# Patient Record
Sex: FEMALE | HISPANIC OR LATINO | Employment: FULL TIME | URBAN - METROPOLITAN AREA
[De-identification: names, ages, dates, MRNs, and addresses within clinical notes are randomized per-mention and may not be internally consistent; named-entity substitution may affect disease eponyms.]

---
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
## 2019-01-06 DIAGNOSIS — Z01.84 IMMUNITY STATUS TESTING: ICD-10-CM

## 2019-01-07 ENCOUNTER — HOSPITAL ENCOUNTER (OUTPATIENT)
Dept: LAB | Facility: MEDICAL CENTER | Age: 19
End: 2019-01-07
Attending: NURSE PRACTITIONER
Payer: COMMERCIAL

## 2019-01-07 DIAGNOSIS — Z01.84 IMMUNITY STATUS TESTING: ICD-10-CM

## 2019-01-07 PROCEDURE — 86480 TB TEST CELL IMMUN MEASURE: CPT

## 2019-01-07 PROCEDURE — 36415 COLL VENOUS BLD VENIPUNCTURE: CPT

## 2019-01-09 LAB
GAMMA INTERFERON BACKGROUND BLD IA-ACNC: 0.03 IU/ML
M TB IFN-G BLD-IMP: NEGATIVE
M TB IFN-G CD4+ BCKGRND COR BLD-ACNC: 0 IU/ML
MITOGEN IGNF BCKGRD COR BLD-ACNC: >10 IU/ML
QFT TB2 - NIL TBQ2: -0.01 IU/ML

## 2019-01-18 ENCOUNTER — APPOINTMENT (OUTPATIENT)
Dept: DERMATOLOGY | Facility: IMAGING CENTER | Age: 19
End: 2019-01-18

## 2019-02-07 ENCOUNTER — APPOINTMENT (OUTPATIENT)
Dept: DERMATOLOGY | Facility: IMAGING CENTER | Age: 19
End: 2019-02-07

## 2019-03-01 ENCOUNTER — OFFICE VISIT (OUTPATIENT)
Dept: MEDICAL GROUP | Facility: MEDICAL CENTER | Age: 19
End: 2019-03-01
Payer: COMMERCIAL

## 2019-03-01 VITALS
DIASTOLIC BLOOD PRESSURE: 70 MMHG | OXYGEN SATURATION: 96 % | RESPIRATION RATE: 16 BRPM | SYSTOLIC BLOOD PRESSURE: 112 MMHG | HEIGHT: 65 IN | BODY MASS INDEX: 33.82 KG/M2 | TEMPERATURE: 97.9 F | HEART RATE: 101 BPM | WEIGHT: 203 LBS

## 2019-03-01 DIAGNOSIS — F41.1 GAD (GENERALIZED ANXIETY DISORDER): ICD-10-CM

## 2019-03-01 PROCEDURE — 99213 OFFICE O/P EST LOW 20 MIN: CPT | Performed by: NURSE PRACTITIONER

## 2019-03-01 RX ORDER — ESCITALOPRAM OXALATE 10 MG/1
10 TABLET ORAL DAILY
Qty: 30 TAB | Refills: 2 | Status: SHIPPED | OUTPATIENT
Start: 2019-03-01 | End: 2019-04-10 | Stop reason: SDUPTHER

## 2019-03-01 NOTE — PATIENT INSTRUCTIONS
Escitalopram tablets  What is this medicine?  ESCITALOPRAM (es sye MARGARITO oh pram) is used to treat depression and certain types of anxiety.  This medicine may be used for other purposes; ask your health care provider or pharmacist if you have questions.  COMMON BRAND NAME(S): Lexapro  What should I tell my health care provider before I take this medicine?  They need to know if you have any of these conditions:  -bipolar disorder or a family history of bipolar disorder  -diabetes  -glaucoma  -heart disease  -kidney or liver disease  -receiving electroconvulsive therapy  -seizures (convulsions)  -suicidal thoughts, plans, or attempt by you or a family member  -an unusual or allergic reaction to escitalopram, the related drug citalopram, other medicines, foods, dyes, or preservatives  -pregnant or trying to become pregnant  -breast-feeding  How should I use this medicine?  Take this medicine by mouth with a glass of water. Follow the directions on the prescription label. You can take it with or without food. If it upsets your stomach, take it with food. Take your medicine at regular intervals. Do not take it more often than directed. Do not stop taking this medicine suddenly except upon the advice of your doctor. Stopping this medicine too quickly may cause serious side effects or your condition may worsen.  A special MedGuide will be given to you by the pharmacist with each prescription and refill. Be sure to read this information carefully each time.  Talk to your pediatrician regarding the use of this medicine in children. Special care may be needed.  Overdosage: If you think you have taken too much of this medicine contact a poison control center or emergency room at once.  NOTE: This medicine is only for you. Do not share this medicine with others.  What if I miss a dose?  If you miss a dose, take it as soon as you can. If it is almost time for your next dose, take only that dose. Do not take double or extra  doses.  What may interact with this medicine?  Do not take this medicine with any of the following medications:  -certain medicines for fungal infections like fluconazole, itraconazole, ketoconazole, posaconazole, voriconazole  -cisapride  -citalopram  -dofetilide  -dronedarone  -linezolid  -MAOIs like Carbex, Eldepryl, Marplan, Nardil, and Parnate  -methylene blue (injected into a vein)  -pimozide  -thioridazine  -ziprasidone  This medicine may also interact with the following medications:  -alcohol  -amphetamines  -aspirin and aspirin-like medicines  -carbamazepine  -certain medicines for depression, anxiety, or psychotic disturbances  -certain medicines for migraine headache like almotriptan, eletriptan, frovatriptan, naratriptan, rizatriptan, sumatriptan, zolmitriptan  -certain medicines for sleep  -certain medicines that treat or prevent blood clots like warfarin, enoxaparin, dalteparin  -cimetidine  -diuretics  -fentanyl  -furazolidone  -isoniazid  -lithium  -metoprolol  -NSAIDs, medicines for pain and inflammation, like ibuprofen or naproxen  -other medicines that prolong the QT interval (cause an abnormal heart rhythm)  -procarbazine  -rasagiline  -supplements like [place]'s wort, kava kava, valerian  -tramadol  -tryptophan  This list may not describe all possible interactions. Give your health care provider a list of all the medicines, herbs, non-prescription drugs, or dietary supplements you use. Also tell them if you smoke, drink alcohol, or use illegal drugs. Some items may interact with your medicine.  What should I watch for while using this medicine?  Tell your doctor if your symptoms do not get better or if they get worse. Visit your doctor or health care professional for regular checks on your progress. Because it may take several weeks to see the full effects of this medicine, it is important to continue your treatment as prescribed by your doctor.  Patients and their families should watch out for  new or worsening thoughts of suicide or depression. Also watch out for sudden changes in feelings such as feeling anxious, agitated, panicky, irritable, hostile, aggressive, impulsive, severely restless, overly excited and hyperactive, or not being able to sleep. If this happens, especially at the beginning of treatment or after a change in dose, call your health care professional.  You may get drowsy or dizzy. Do not drive, use machinery, or do anything that needs mental alertness until you know how this medicine affects you. Do not stand or sit up quickly, especially if you are an older patient. This reduces the risk of dizzy or fainting spells. Alcohol may interfere with the effect of this medicine. Avoid alcoholic drinks.  Your mouth may get dry. Chewing sugarless gum or sucking hard candy, and drinking plenty of water may help. Contact your doctor if the problem does not go away or is severe.  What side effects may I notice from receiving this medicine?  Side effects that you should report to your doctor or health care professional as soon as possible:  -allergic reactions like skin rash, itching or hives, swelling of the face, lips, or tongue  -anxious  -black, tarry stools  -changes in vision  -confusion  -elevated mood, decreased need for sleep, racing thoughts, impulsive behavior  -eye pain  -fast, irregular heartbeat  -feeling faint or lightheaded, falls  -feeling agitated, angry, or irritable  -hallucination, loss of contact with reality  -loss of balance or coordination  -loss of memory  -painful or prolonged erections  -restlessness, pacing, inability to keep still  -seizures  -stiff muscles  -suicidal thoughts or other mood changes  -trouble sleeping  -unusual bleeding or bruising  -unusually weak or tired  -vomiting  Side effects that usually do not require medical attention (report to your doctor or health care professional if they continue or are bothersome):  -changes in appetite  -change in sex  drive or performance  -headache  -increased sweating  -indigestion, nausea  -tremors  This list may not describe all possible side effects. Call your doctor for medical advice about side effects. You may report side effects to FDA at 9-923-MVU-7599.  Where should I keep my medicine?  Keep out of reach of children.  Store at room temperature between 15 and 30 degrees C (59 and 86 degrees F). Throw away any unused medicine after the expiration date.  NOTE: This sheet is a summary. It may not cover all possible information. If you have questions about this medicine, talk to your doctor, pharmacist, or health care provider.  © 2018 [employer]/Gold Standard (2017-05-22 13:20:23)

## 2019-03-01 NOTE — PROGRESS NOTES
cc:  anxiety      Subjective:     HPI:     Shon Kemp is a 19 y.o. female here to discuss the evaluation and management of:    Anxiety  This is a new problem.  Patient states that she is been feeling a heaviness on her chest and she is been having racing thoughts, daily worries and having trouble falling asleep.  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  She feels that she has daily worries about small problems and has a hard time falling asleep because she is running through the events of the day and also worrying about what will be occurring the next day.  She occasionally feels her heart racing.      ROS:  Denies any Headache, Blurred Vision, Confusion, Chest pain,  Shortness of breath,  Abdominal pain, Changes of bowel or bladder, Lower ext edema, Fevers, Nights sweats, Weight Changes, Focal weakness or numbness.  And all other systems are negative.Anxiety        Current Outpatient Prescriptions:   •  escitalopram (LEXAPRO) 10 MG Tab, Take 1 Tab by mouth every day., Disp: 30 Tab, Rfl: 2    No Known Allergies    Past Medical History:   Diagnosis Date   • IUD (intrauterine device) in place     Paragard 2017     No past surgical history on file.  Family History   Problem Relation Age of Onset   • Thyroid Mother    • Hyperlipidemia Mother    • Asthma Father    • Bipolar disorder Father    • Thyroid Sister    • Diabetes Maternal Grandmother    • Heart Attack Maternal Grandfather         x3   • Hypertension Maternal Grandfather    • Hyperlipidemia Maternal Grandfather    • No Known Problems Paternal Grandmother    • No Known Problems Paternal Grandfather    • Diabetes Sister         type 1     Social History     Social History   • Marital status: Single     Spouse name: N/A   • Number of children: N/A   • Years of education: N/A     Occupational History   • Not on file.     Social History Main Topics  "  • Smoking status: Never Smoker   • Smokeless tobacco: Never Used   • Alcohol use Yes   • Drug use: No   • Sexual activity: Yes     Partners: Male     Birth control/ protection: IUD     Other Topics Concern   • Not on file     Social History Narrative   • No narrative on file       Objective:     Vitals: /70   Pulse (!) 101   Temp 36.6 °C (97.9 °F)   Resp 16   Ht 1.651 m (5' 5\")   Wt 92.1 kg (203 lb)   SpO2 96%   BMI 33.78 kg/m²    General: Alert, pleasant, NAD  HEENT: Normocephalic.    Skin: Warm, dry, no rashes.  Musculoskeletal: Gait is normal.  Moves all extremities well.  Extremities: No leg edema. No discoloration  Neurological: No tremors, sensation grossly intact, gait is normal,   Psych:  Affect/mood is normal, judgement is good, memory is intact, grooming is appropriate.    Assessment/Plan:     Shon was seen today for anxiety.    Diagnoses and all orders for this visit:    PHILLIP (generalized anxiety disorder)  Not well controlled. Feeling anxious with school and working 40 hours.  Have advised patient to try to cut back at her work hours if this is feasible.  Have also encouraged her to work on deep breathing exercises multiple times throughout the day.  Again reduce caffeine intake, work on trying to incorporate some sort of physical activity throughout the day.  Have discussed that we will trial Lexapro 10 mg and she will follow back up in 6 weeks.  Have reviewed side effects and medication implications.    -     escitalopram (LEXAPRO) 10 MG Tab; Take 1 Tab by mouth every day.    Return in about 6 weeks (around 4/12/2019).        Meena PENG  "

## 2019-03-04 ENCOUNTER — APPOINTMENT (OUTPATIENT)
Dept: DERMATOLOGY | Facility: IMAGING CENTER | Age: 19
End: 2019-03-04

## 2019-04-10 ENCOUNTER — OFFICE VISIT (OUTPATIENT)
Dept: MEDICAL GROUP | Facility: MEDICAL CENTER | Age: 19
End: 2019-04-10
Payer: COMMERCIAL

## 2019-04-10 VITALS
WEIGHT: 197 LBS | HEIGHT: 65 IN | RESPIRATION RATE: 16 BRPM | HEART RATE: 71 BPM | DIASTOLIC BLOOD PRESSURE: 70 MMHG | TEMPERATURE: 97.3 F | SYSTOLIC BLOOD PRESSURE: 110 MMHG | OXYGEN SATURATION: 97 % | BODY MASS INDEX: 32.82 KG/M2

## 2019-04-10 DIAGNOSIS — Z83.3 FAMILY HISTORY OF DIABETES MELLITUS TYPE I: ICD-10-CM

## 2019-04-10 DIAGNOSIS — F41.1 GAD (GENERALIZED ANXIETY DISORDER): ICD-10-CM

## 2019-04-10 PROCEDURE — 99213 OFFICE O/P EST LOW 20 MIN: CPT | Performed by: NURSE PRACTITIONER

## 2019-04-10 RX ORDER — ESCITALOPRAM OXALATE 10 MG/1
10 TABLET ORAL DAILY
Qty: 30 TAB | Refills: 6 | Status: SHIPPED | OUTPATIENT
Start: 2019-04-10 | End: 2019-05-10 | Stop reason: SDUPTHER

## 2019-04-10 NOTE — LETTER
"April 10, 2019        Shon Krauseenzuela  [address]  [id number]  [place] [address]      To Whom it May Concern:    Shon Kemp is under my medical care at this clinic. It has come to my attention that she finds her dog \"Moe\" emotionally supportive and benefits from his company therapeutically.           Sincerely,        ROSARIO Little.    Electronically Signed     "

## 2019-04-10 NOTE — PROGRESS NOTES
cc:  Follow up anxiety/concerned about diabetes      Subjective:     HPI:     Shon Kemp is a 19 y.o. female here to discuss the evaluation and management of:    Anxiety  Patient states that she has been tolerating the Lexapro since her last visit.  States this is very helpful for her anxiety and racing thoughts.  Denies any side effects.  She would like to continue taking this medication at this time.  She needs refills.      Family history of type 1 diabetes  Patient does inform me that her younger full sister who is 23 was recently diagnosed with Type 1 diabetes and her half sister who is 35 has Type 1 diabetes as well. She is concerned that she will end up with type 1 diabetes as well.  Denies any polyuria, polydipsia, or weight loss.  She is requesting C-peptide labs.    ROS:  Denies any Headache, Blurred Vision, Confusion, Chest pain,  Shortness of breath,  Abdominal pain, Changes of bowel or bladder, Lower ext edema, Fevers, Nights sweats, Weight Changes, Focal weakness or numbness.  And all other systems are negative.        Current Outpatient Prescriptions:   •  escitalopram (LEXAPRO) 10 MG Tab, Take 1 Tab by mouth every day., Disp: 30 Tab, Rfl: 6    No Known Allergies    Past Medical History:   Diagnosis Date   • IUD (intrauterine device) in place     Paragard 2017     No past surgical history on file.  Family History   Problem Relation Age of Onset   • Thyroid Mother    • Hyperlipidemia Mother    • Asthma Father    • Bipolar disorder Father    • Diabetes Sister         type 1   • Diabetes Maternal Grandmother         type 2   • Heart Attack Maternal Grandfather         x3   • Hypertension Maternal Grandfather    • Hyperlipidemia Maternal Grandfather    • No Known Problems Paternal Grandmother    • No Known Problems Paternal Grandfather    • Diabetes Sister         type 1     Social History     Social History   • Marital status: Single     Spouse name: N/A   • Number of children: N/A   •  "Years of education: N/A     Occupational History   • Not on file.     Social History Main Topics   • Smoking status: Never Smoker   • Smokeless tobacco: Never Used   • Alcohol use Yes   • Drug use: No   • Sexual activity: Yes     Partners: Male     Birth control/ protection: IUD     Other Topics Concern   • Not on file     Social History Narrative   • No narrative on file       Objective:     Vitals: /70   Pulse 71   Temp 36.3 °C (97.3 °F)   Resp 16   Ht 1.651 m (5' 5\")   Wt 89.4 kg (197 lb)   SpO2 97%   BMI 32.78 kg/m²    General: Alert, pleasant, NAD  HEENT: Normocephalic.    Skin: Warm, dry, no rashes.  Musculoskeletal: Gait is normal.  Moves all extremities well.  Extremities: No leg edema. No discoloration  Neurological: No tremors, sensation grossly intact, gait is normal,   Psych:  Affect/mood is normal, judgement is good, memory is intact, grooming is appropriate.    Assessment/Plan:     Diagnoses and all orders for this visit:    PHILLIP (generalized anxiety disorder)  Improving with the Lexapro.  Patient like to continue on this medication is very helpful for her racing thoughts and her anxiety.  Have refilled this for patient.  Follow-up if changes present.  -     escitalopram (LEXAPRO) 10 MG Tab; Take 1 Tab by mouth every day.    Family history of diabetes mellitus type I  -     ANTI PHILLIP ANTIBODIES  -     INSULIN AND C-PEPTIDE, SERUM        Return in about 6 months (around 10/10/2019).          Meena PENG"

## 2019-04-11 ENCOUNTER — HOSPITAL ENCOUNTER (OUTPATIENT)
Dept: LAB | Facility: MEDICAL CENTER | Age: 19
End: 2019-04-11
Attending: NURSE PRACTITIONER
Payer: COMMERCIAL

## 2019-04-11 PROCEDURE — 84681 ASSAY OF C-PEPTIDE: CPT

## 2019-04-11 PROCEDURE — 86337 INSULIN ANTIBODIES: CPT

## 2019-04-11 PROCEDURE — 86341 ISLET CELL ANTIBODY: CPT

## 2019-04-11 PROCEDURE — 36415 COLL VENOUS BLD VENIPUNCTURE: CPT

## 2019-04-13 LAB — C PEPTIDE SERPL-MCNC: 1.8 NG/ML (ref 0.8–3.5)

## 2019-04-14 LAB — GAD65 AB SER IA-ACNC: <5 IU/ML (ref 0–5)

## 2019-04-16 LAB — INSULIN HUMAN AB SER-ACNC: <0.4 U/ML (ref 0–0.4)

## 2019-04-17 ENCOUNTER — OFFICE VISIT (OUTPATIENT)
Dept: DERMATOLOGY | Facility: IMAGING CENTER | Age: 19
End: 2019-04-17
Payer: COMMERCIAL

## 2019-04-17 VITALS — WEIGHT: 197 LBS | HEIGHT: 65 IN | BODY MASS INDEX: 32.82 KG/M2 | TEMPERATURE: 97.7 F

## 2019-04-17 DIAGNOSIS — L85.8 KERATOSIS PILARIS: ICD-10-CM

## 2019-04-17 DIAGNOSIS — L70.0 ACNE VULGARIS: ICD-10-CM

## 2019-04-17 DIAGNOSIS — L29.9 PRURITUS: ICD-10-CM

## 2019-04-17 DIAGNOSIS — B36.0 TINEA VERSICOLOR: ICD-10-CM

## 2019-04-17 DIAGNOSIS — D22.4 IRRITATED NEVUS OF NECK: ICD-10-CM

## 2019-04-17 PROCEDURE — 99203 OFFICE O/P NEW LOW 30 MIN: CPT | Performed by: DERMATOLOGY

## 2019-04-17 RX ORDER — TRETINOIN 0.5 MG/G
CREAM TOPICAL
Qty: 45 G | Refills: 3 | Status: SHIPPED | OUTPATIENT
Start: 2019-04-17 | End: 2020-10-25

## 2019-04-17 RX ORDER — CIPROFLOXACIN HYDROCHLORIDE 3.5 MG/ML
1 SOLUTION/ DROPS TOPICAL EVERY 6 HOURS
Qty: 1 BOTTLE | Refills: 0 | Status: SHIPPED | OUTPATIENT
Start: 2019-04-17 | End: 2020-10-25

## 2019-04-17 ASSESSMENT — ENCOUNTER SYMPTOMS
CHILLS: 0
FEVER: 0

## 2019-04-17 NOTE — PROGRESS NOTES
Dermatology New Patient Visit    Chief Complaint   Patient presents with   • Skin Lesion       Subjective:     HPI:    Shon Kemp is a 19 y.o. female presenting for    HPI: Bumps on both arms   Time present: 5 years   Painful lesion: No  Itching lesion: sometimes  Enlarging lesion: No  Anything make it better or worse? Unsure what flares it up    HPI: mole   Location: right side collar bone   Time present: 10 years   Painful lesion: No  Itching lesion: Yes  Enlarging lesion: yes  Anything make it better or worse? No     HPI/location: white discoloration on the arms  Time present: on and off x years  Painful lesion: No  Itching lesion: No  Enlarging lesion: No  Anything make it better or worse? Might get worse in the summer    Acne  Started: years ago   Active on: face  Aggravated by: dairy  Treatment currently used: drunk elephant skin care line  Prior treatments used: tretinoin - was drying  Face wash/moisturizer: drunk elephant  Family history of scarring acne: none  Contraception: yes - IUD    History of skin cancer: No  History of biopsies:No  History of blistering/severe sunburns:Yes, Details: youth  Family history of skin cancer:Yes, Details: father   Family history of atypical moles:No        Past Medical History:   Diagnosis Date   • IUD (intrauterine device) in place     Paragard 2017       Current Outpatient Prescriptions on File Prior to Visit   Medication Sig Dispense Refill   • escitalopram (LEXAPRO) 10 MG Tab Take 1 Tab by mouth every day. 30 Tab 6     No current facility-administered medications on file prior to visit.        No Known Allergies    Family History   Problem Relation Age of Onset   • Thyroid Mother    • Hyperlipidemia Mother    • Asthma Father    • Bipolar disorder Father    • Diabetes Sister         type 1   • Diabetes Maternal Grandmother         type 2   • Heart Attack Maternal Grandfather         x3   • Hypertension Maternal Grandfather    • Hyperlipidemia Maternal  "Grandfather    • No Known Problems Paternal Grandmother    • No Known Problems Paternal Grandfather    • Diabetes Sister         type 1       Social History     Social History   • Marital status: Single     Spouse name: N/A   • Number of children: N/A   • Years of education: N/A     Occupational History   • Not on file.     Social History Main Topics   • Smoking status: Never Smoker   • Smokeless tobacco: Never Used   • Alcohol use Yes   • Drug use: No   • Sexual activity: Yes     Partners: Male     Birth control/ protection: IUD     Other Topics Concern   • Not on file     Social History Narrative   • No narrative on file       Review of Systems   Constitutional: Negative for chills and fever.   Skin: Positive for itching and rash.   All other systems reviewed and are negative.       Objective:     A focused cutaneous exam was completed including: hair, ears, face, eyelids, conjunctiva, lips,  Neck, upper chest, left and right upper extremities (including hands/digits and fingernails) with the following pertinent findings listed below. Remaining above-listed examined areas within normal limits / negative for rashes or lesions.    Temp 36.5 °C (97.7 °F)   Ht 1.651 m (5' 5\")   Wt 89.4 kg (197 lb)     Physical Exam   Constitutional: She is oriented to person, place, and time and well-developed, well-nourished, and in no distress.   HENT:   Head: Normocephalic and atraumatic.       Eyes: Conjunctivae and lids are normal.   Neck: Normal range of motion.   Pulmonary/Chest: Effort normal.   Neurological: She is alert and oriented to person, place, and time.   Skin: Skin is warm and dry.        Psychiatric: Mood and affect normal.   Vitals reviewed.      DATA: none applicable to review    Assessment and Plan:     1. Acne vulgaris  - Instructed to start retin-a 0.05% cream qhs. To use pea-sized amount on entire face; start 2-3 nights/week and titrate up as tolerated. S/e irritation discussed. Discussed cannot be used " during pregnancy.    2. Irritated nevus of neck, +pruritus  - patient to make appointment for procedure per her schedule    3. Keratosis pilaris  - educated patient about diagnosis, management options, and expectations of treatment  - OTC recommendations  - tretinoin for when very bumpy    5. Tinea versicolor  - recommended selsun blue 2-3 times/week (to leave on for 5-10 minutes prior to rinsing) for maintenance therapy  - consider ketoconazole 2% wash    Followup: Return for procedure.    Cheri Joe M.D.

## 2019-04-23 ENCOUNTER — APPOINTMENT (OUTPATIENT)
Dept: DERMATOLOGY | Facility: IMAGING CENTER | Age: 19
End: 2019-04-23

## 2019-04-24 ENCOUNTER — OFFICE VISIT (OUTPATIENT)
Dept: URGENT CARE | Facility: CLINIC | Age: 19
End: 2019-04-24
Payer: COMMERCIAL

## 2019-04-24 VITALS
DIASTOLIC BLOOD PRESSURE: 84 MMHG | RESPIRATION RATE: 12 BRPM | OXYGEN SATURATION: 98 % | HEART RATE: 79 BPM | TEMPERATURE: 98.7 F | SYSTOLIC BLOOD PRESSURE: 122 MMHG | WEIGHT: 197 LBS | BODY MASS INDEX: 32.82 KG/M2 | HEIGHT: 65 IN

## 2019-04-24 DIAGNOSIS — H81.13 BENIGN PAROXYSMAL POSITIONAL VERTIGO, BILATERAL: ICD-10-CM

## 2019-04-24 PROCEDURE — 99214 OFFICE O/P EST MOD 30 MIN: CPT | Performed by: PHYSICIAN ASSISTANT

## 2019-04-24 RX ORDER — MECLIZINE HYDROCHLORIDE 25 MG/1
25 TABLET ORAL 3 TIMES DAILY PRN
Qty: 30 TAB | Refills: 0 | Status: SHIPPED | OUTPATIENT
Start: 2019-04-24 | End: 2020-10-25

## 2019-04-24 ASSESSMENT — ENCOUNTER SYMPTOMS
VERTIGO: 1
NAUSEA: 0
SHORTNESS OF BREATH: 0
SORE THROAT: 0
FEVER: 0
CHILLS: 0
VOMITING: 0
ABDOMINAL PAIN: 0
BLURRED VISION: 0
DIZZINESS: 1
MYALGIAS: 0
VISUAL CHANGE: 0
COUGH: 0
HEADACHES: 0
DIARRHEA: 0
EYE PAIN: 0
PALPITATIONS: 0

## 2019-04-24 NOTE — LETTER
April 24, 2019         Patient: Shon Kemp   YOB: 2000   Date of Visit: 4/24/2019           To Whom it May Concern:    Shon Kemp was seen in my clinic on 4/24/2019. Please excuse her from work today.    If you have any questions or concerns, please don't hesitate to call.        Sincerely,           Emerald Lane P.A.-C.  Electronically Signed

## 2019-04-24 NOTE — PROGRESS NOTES
Subjective:      Shon Kemp is a 19 y.o. female who presents with Dizziness (dizziness, this moring )      Dizziness   This is a new problem. The current episode started today (Started when she woke up this morning). The problem occurs constantly. The problem has been waxing and waning. Associated symptoms include vertigo. Pertinent negatives include no abdominal pain, chest pain, chills, congestion, coughing, fever, headaches, myalgias, nausea, rash, sore throat, urinary symptoms, visual change or vomiting. Exacerbated by: Changes in her head position. She has tried nothing for the symptoms.       Review of Systems   Constitutional: Negative for chills, fever and malaise/fatigue.   HENT: Negative for congestion, ear pain, sore throat and tinnitus.    Eyes: Negative for blurred vision and pain.   Respiratory: Negative for cough and shortness of breath.    Cardiovascular: Negative for chest pain and palpitations.   Gastrointestinal: Negative for abdominal pain, diarrhea, nausea and vomiting.   Musculoskeletal: Negative for myalgias.   Skin: Negative for rash.   Neurological: Positive for dizziness and vertigo. Negative for headaches.       PMH:  has a past medical history of IUD (intrauterine device) in place.  MEDS:   Current Outpatient Prescriptions:   •  meclizine (ANTIVERT) 25 MG Tab, Take 1 Tab by mouth 3 times a day as needed., Disp: 30 Tab, Rfl: 0  •  tretinoin (RETIN-A) 0.05 % cream, Pea-sized amount to the entire face at night. Start q2-3nights a week, increase to nightly as tolerated (Patient not taking: Reported on 4/24/2019), Disp: 45 g, Rfl: 3  •  ciprofloxacin (CILOXIN) 0.3 % Solution, Place 1 Drop in right eye every 6 hours. For 5 days (Patient not taking: Reported on 4/24/2019), Disp: 1 Bottle, Rfl: 0  •  escitalopram (LEXAPRO) 10 MG Tab, Take 1 Tab by mouth every day., Disp: 30 Tab, Rfl: 6  ALLERGIES: No Known Allergies  SURGHX: No past surgical history on file.  SOCHX:  reports that  "she has never smoked. She has never used smokeless tobacco. She reports that she drinks alcohol. She reports that she does not use drugs.  FH: Family history was reviewed, no pertinent findings to report     Objective:     /84 (BP Location: Left arm, Patient Position: Sitting, BP Cuff Size: Adult)   Pulse 79   Temp 37.1 °C (98.7 °F) (Temporal)   Resp 12   Ht 1.651 m (5' 5\")   Wt 89.4 kg (197 lb)   SpO2 98%   BMI 32.78 kg/m²      Physical Exam   Constitutional: She is oriented to person, place, and time. She appears well-developed and well-nourished.   HENT:   Head: Normocephalic and atraumatic.   Right Ear: Hearing, tympanic membrane, external ear and ear canal normal.   Left Ear: Hearing, tympanic membrane, external ear and ear canal normal.   Nose: Nose normal. Right sinus exhibits no maxillary sinus tenderness and no frontal sinus tenderness. Left sinus exhibits no maxillary sinus tenderness and no frontal sinus tenderness.   Eyes: Pupils are equal, round, and reactive to light. Conjunctivae are normal. Right eye exhibits nystagmus (+ [place] Hallpike). Left eye exhibits nystagmus (+ Margarita Hallpike).   Neck: Normal range of motion.   Cardiovascular: Normal rate, regular rhythm and normal heart sounds.    No murmur heard.  Pulmonary/Chest: Effort normal and breath sounds normal. She has no wheezes.   Lymphadenopathy:     She has no cervical adenopathy.   Neurological: She is alert and oriented to person, place, and time.   Skin: Skin is warm and dry. Capillary refill takes less than 2 seconds.   Psychiatric: She has a normal mood and affect. Her behavior is normal. Judgment normal.        Assessment/Plan:     1. Benign paroxysmal positional vertigo, bilateral  - meclizine (ANTIVERT) 25 MG Tab; Take 1 Tab by mouth 3 times a day as needed.  Dispense: 30 Tab; Refill: 0  - Gave patient some exercises to do 3 times a day  - If no improvement after 3-4 days return for reevaluation and possible referral to " ENT      Differential Diagnosis, natural history, and supportive care discussed. Return to the Urgent Care or follow up with your PCP if symptoms fail to resolve, or for any new or worsening symptoms. Emergency room precautions discussed. Patient and/or family appears understanding of information.

## 2019-04-25 ENCOUNTER — TELEPHONE (OUTPATIENT)
Dept: URGENT CARE | Facility: CLINIC | Age: 19
End: 2019-04-25

## 2019-04-25 NOTE — TELEPHONE ENCOUNTER
Patient called stating that she woke up this morning and thinks she has a sinus infection.  Advised that she would need to be seen again for this because yesterday on physical exam there is no evidence of this.

## 2019-05-02 ENCOUNTER — HOSPITAL ENCOUNTER (OUTPATIENT)
Dept: LAB | Facility: MEDICAL CENTER | Age: 19
End: 2019-05-02
Attending: DERMATOLOGY
Payer: COMMERCIAL

## 2019-05-02 ENCOUNTER — OFFICE VISIT (OUTPATIENT)
Dept: DERMATOLOGY | Facility: IMAGING CENTER | Age: 19
End: 2019-05-02
Payer: COMMERCIAL

## 2019-05-02 DIAGNOSIS — D22.4 IRRITATED NEVUS OF NECK: ICD-10-CM

## 2019-05-02 DIAGNOSIS — L29.9 PRURITUS: ICD-10-CM

## 2019-05-02 LAB — PATHOLOGY CONSULT NOTE: NORMAL

## 2019-05-02 PROCEDURE — 11102 TANGNTL BX SKIN SINGLE LES: CPT | Performed by: DERMATOLOGY

## 2019-05-02 PROCEDURE — 88305 TISSUE EXAM BY PATHOLOGIST: CPT

## 2019-05-02 ASSESSMENT — ENCOUNTER SYMPTOMS
CHILLS: 0
FEVER: 0

## 2019-05-02 NOTE — PROGRESS NOTES
Dermatology Return Patient Visit    Chief Complaint   Patient presents with   • Nevus     Bx   • Follow-Up       Subjective:     HPI:    Shon Kemp is a 19 y.o. female presenting for    Here for bx of nevus on right clavicle    Time present: 10 years   Painful lesion: No  Itching lesion: Yes  Enlarging lesion: yes  Anything make it better or worse? Jewelry    History of skin cancer: No  History of biopsies:No  History of blistering/severe sunburns:Yes, Details: youth  Family history of skin cancer:Yes, Details: father   Family history of atypical moles:No        Past Medical History:   Diagnosis Date   • IUD (intrauterine device) in place     Paragard 2017       Current Outpatient Prescriptions on File Prior to Visit   Medication Sig Dispense Refill   • meclizine (ANTIVERT) 25 MG Tab Take 1 Tab by mouth 3 times a day as needed. 30 Tab 0   • tretinoin (RETIN-A) 0.05 % cream Pea-sized amount to the entire face at night. Start q2-3nights a week, increase to nightly as tolerated (Patient not taking: Reported on 4/24/2019) 45 g 3   • ciprofloxacin (CILOXIN) 0.3 % Solution Place 1 Drop in right eye every 6 hours. For 5 days (Patient not taking: Reported on 4/24/2019) 1 Bottle 0   • escitalopram (LEXAPRO) 10 MG Tab Take 1 Tab by mouth every day. 30 Tab 6     No current facility-administered medications on file prior to visit.        No Known Allergies    Family History   Problem Relation Age of Onset   • Thyroid Mother    • Hyperlipidemia Mother    • Asthma Father    • Bipolar disorder Father    • Diabetes Sister         type 1   • Diabetes Maternal Grandmother         type 2   • Heart Attack Maternal Grandfather         x3   • Hypertension Maternal Grandfather    • Hyperlipidemia Maternal Grandfather    • No Known Problems Paternal Grandmother    • No Known Problems Paternal Grandfather    • Diabetes Sister         type 1       Social History     Social History   • Marital status: Single     Spouse name:  N/A   • Number of children: N/A   • Years of education: N/A     Occupational History   • Not on file.     Social History Main Topics   • Smoking status: Never Smoker   • Smokeless tobacco: Never Used   • Alcohol use Yes   • Drug use: No   • Sexual activity: Yes     Partners: Male     Birth control/ protection: IUD     Other Topics Concern   • Not on file     Social History Narrative   • No narrative on file       Review of Systems   Constitutional: Negative for chills and fever.   Skin: Positive for itching. Negative for rash.   All other systems reviewed and are negative.       Objective:     A focused cutaneous exam was completed including: hair, ears, face, eyelids, conjunctiva, lips,  Neck, upper chest, left and right upper extremities (including hands/digits and fingernails) with the following pertinent findings listed below. Remaining above-listed examined areas within normal limits / negative for rashes or lesions.    There were no vitals taken for this visit.    Physical Exam   Constitutional: She is oriented to person, place, and time and well-developed, well-nourished, and in no distress.   HENT:   Head: Normocephalic and atraumatic.       Eyes: Conjunctivae and lids are normal.   Neck: Normal range of motion.   Pulmonary/Chest: Effort normal.   Neurological: She is alert and oriented to person, place, and time.   Skin: Skin is warm and dry.   Psychiatric: Mood and affect normal.       DATA: none applicable to review    Assessment and Plan:     1. Irritated nevus of neck, +Pruritus  Procedure Note   Procedure: Biopsy by shave technique  Location: as noted above  Size: as noted in exam  Preoperative diagnosis: compound nevus  Risks, benefits and alternatives of procedure discussed and written informed consent obtained. Time out completed. Area of biopsy prepped with alcohol. Anesthesia with 1% lidocaine with epinephrine administered with 30 gauge needle. Shave biopsy of the site performed. Hemostasis  achieved with pressure and aluminum chloride. Vaseline applied to wound with bandage. Patient tolerated procedure well and there were no complications. The specimen was sent to the pathology lab by the staff. Wound care was discussed.  - Pathology Specimen; Future    Followup: Return if symptoms worsen or fail to improve.    Cheri Joe M.D.

## 2019-05-10 RX ORDER — ESCITALOPRAM OXALATE 20 MG/1
20 TABLET ORAL DAILY
Qty: 90 TAB | Refills: 3 | Status: SHIPPED | OUTPATIENT
Start: 2019-05-10 | End: 2020-10-25

## 2019-05-20 ENCOUNTER — APPOINTMENT (OUTPATIENT)
Dept: DERMATOLOGY | Facility: IMAGING CENTER | Age: 19
End: 2019-05-20

## 2019-05-24 ENCOUNTER — APPOINTMENT (OUTPATIENT)
Dept: DERMATOLOGY | Facility: IMAGING CENTER | Age: 19
End: 2019-05-24

## 2019-06-10 ENCOUNTER — APPOINTMENT (OUTPATIENT)
Dept: DERMATOLOGY | Facility: IMAGING CENTER | Age: 19
End: 2019-06-10

## 2019-06-24 ENCOUNTER — APPOINTMENT (OUTPATIENT)
Dept: DERMATOLOGY | Facility: IMAGING CENTER | Age: 19
End: 2019-06-24

## 2019-06-26 DIAGNOSIS — Z71.3 ENCOUNTER FOR WEIGHT LOSS COUNSELING: ICD-10-CM

## 2019-06-26 DIAGNOSIS — E66.9 OBESITY (BMI 30-39.9): ICD-10-CM

## 2019-06-27 RX ORDER — PHENTERMINE HYDROCHLORIDE 15 MG/1
15 CAPSULE ORAL EVERY MORNING
Qty: 30 CAP | Refills: 0 | OUTPATIENT
Start: 2019-06-27 | End: 2019-07-27

## 2019-07-09 ENCOUNTER — APPOINTMENT (OUTPATIENT)
Dept: DERMATOLOGY | Facility: IMAGING CENTER | Age: 19
End: 2019-07-09

## 2019-07-24 ENCOUNTER — HOSPITAL ENCOUNTER (OUTPATIENT)
Facility: MEDICAL CENTER | Age: 19
End: 2019-07-24
Attending: NURSE PRACTITIONER
Payer: COMMERCIAL

## 2019-07-24 PROCEDURE — 87491 CHLMYD TRACH DNA AMP PROBE: CPT

## 2019-07-24 PROCEDURE — 87077 CULTURE AEROBIC IDENTIFY: CPT

## 2019-07-24 PROCEDURE — 87591 N.GONORRHOEAE DNA AMP PROB: CPT

## 2019-07-24 PROCEDURE — 87186 SC STD MICRODIL/AGAR DIL: CPT

## 2019-07-24 PROCEDURE — 87070 CULTURE OTHR SPECIMN AEROBIC: CPT

## 2019-07-24 PROCEDURE — 87205 SMEAR GRAM STAIN: CPT

## 2019-07-25 LAB
AMBIGUOUS DTTM AMBI4: NORMAL
AMBIGUOUS DTTM AMBI4: NORMAL
C TRACH DNA SPEC QL NAA+PROBE: NEGATIVE
N GONORRHOEA DNA SPEC QL NAA+PROBE: NEGATIVE
SIGNIFICANT IND 70042: NORMAL
SITE SITE: NORMAL
SOURCE SOURCE: NORMAL
SPECIMEN SOURCE: NORMAL

## 2019-07-26 LAB
GRAM STN SPEC: NORMAL
SIGNIFICANT IND 70042: NORMAL
SITE SITE: NORMAL
SOURCE SOURCE: NORMAL

## 2019-07-28 LAB
BACTERIA GENITAL AEROBE CULT: ABNORMAL
GRAM STN SPEC: ABNORMAL
SIGNIFICANT IND 70042: ABNORMAL
SITE SITE: ABNORMAL
SOURCE SOURCE: ABNORMAL

## 2019-08-06 ENCOUNTER — APPOINTMENT (OUTPATIENT)
Dept: DERMATOLOGY | Facility: IMAGING CENTER | Age: 19
End: 2019-08-06

## 2019-08-12 DIAGNOSIS — N92.6 MISSED PERIOD: ICD-10-CM

## 2019-08-12 DIAGNOSIS — R53.83 OTHER FATIGUE: ICD-10-CM

## 2019-09-09 ENCOUNTER — OFFICE VISIT (OUTPATIENT)
Dept: URGENT CARE | Facility: CLINIC | Age: 19
End: 2019-09-09
Payer: COMMERCIAL

## 2019-09-09 ENCOUNTER — HOSPITAL ENCOUNTER (OUTPATIENT)
Dept: LAB | Facility: MEDICAL CENTER | Age: 19
End: 2019-09-09
Payer: COMMERCIAL

## 2019-09-09 VITALS
HEART RATE: 82 BPM | RESPIRATION RATE: 16 BRPM | HEIGHT: 65 IN | SYSTOLIC BLOOD PRESSURE: 110 MMHG | BODY MASS INDEX: 32.32 KG/M2 | DIASTOLIC BLOOD PRESSURE: 80 MMHG | WEIGHT: 194 LBS | TEMPERATURE: 97.8 F | OXYGEN SATURATION: 99 %

## 2019-09-09 DIAGNOSIS — Z87.09 HISTORY OF RECURRENT TONSILLITIS: ICD-10-CM

## 2019-09-09 DIAGNOSIS — J06.9 UPPER RESPIRATORY TRACT INFECTION, UNSPECIFIED TYPE: ICD-10-CM

## 2019-09-09 LAB
BDY FAT % MEASURED: 31.7 %
BP DIAS: 79 MMHG
BP SYS: 124 MMHG
CHOLEST SERPL-MCNC: 140 MG/DL (ref 100–199)
DIABETES HTDIA: NO
EVENT NAME HTEVT: NORMAL
FASTING HTFAS: YES
GLUCOSE SERPL-MCNC: 97 MG/DL (ref 65–99)
HDLC SERPL-MCNC: 40 MG/DL
HYPERTENSION HTHYP: NO
LDLC SERPL CALC-MCNC: 80 MG/DL
SCREENING LOC CITY HTCIT: NORMAL
SCREENING LOC STATE HTSTA: NORMAL
SCREENING LOCATION HTLOC: NORMAL
SMOKING HTSMO: NO
SUBSCRIBER ID HTSID: NORMAL
TRIGL SERPL-MCNC: 98 MG/DL (ref 0–149)

## 2019-09-09 PROCEDURE — S5190 WELLNESS ASSESSMENT BY NONPH: HCPCS

## 2019-09-09 PROCEDURE — 36415 COLL VENOUS BLD VENIPUNCTURE: CPT

## 2019-09-09 PROCEDURE — 80061 LIPID PANEL: CPT

## 2019-09-09 PROCEDURE — 82947 ASSAY GLUCOSE BLOOD QUANT: CPT

## 2019-09-09 PROCEDURE — 99214 OFFICE O/P EST MOD 30 MIN: CPT | Performed by: PHYSICIAN ASSISTANT

## 2019-09-09 RX ORDER — CODEINE PHOSPHATE AND GUAIFENESIN 10; 100 MG/5ML; MG/5ML
5 SOLUTION ORAL
Qty: 50 ML | Refills: 0 | Status: SHIPPED | OUTPATIENT
Start: 2019-09-09 | End: 2019-09-19

## 2019-09-09 ASSESSMENT — ENCOUNTER SYMPTOMS
DIZZINESS: 0
HEADACHES: 0
SORE THROAT: 1
ABDOMINAL PAIN: 0
COUGH: 1
SHORTNESS OF BREATH: 0
CHILLS: 0
VOMITING: 0
MYALGIAS: 0
SPUTUM PRODUCTION: 1
RHINORRHEA: 1
WHEEZING: 0
DIARRHEA: 0
TINGLING: 0
NECK PAIN: 0
EYE DISCHARGE: 0
EYE REDNESS: 0
FEVER: 0

## 2019-09-09 NOTE — PROGRESS NOTES
Subjective:      Shon Kemp is a 19 y.o. female who presents with Cough (coughing up green phlegm, congestion, x4-5days)            Patient is a 19-year-old female who presents to urgent care with cough, congestion and sore throat for the last 4 to 5 days.  She reports last night the cough kept her up all night time.  She is been utilizing cough drops without relief his symptoms.  Also of note patient is requesting referral to ENT today as she reports history of tonsilloliths along with recurrent tonsillitis in the past.  She does report a slight sore throat currently however this is worse in the morning.    Cough   This is a new problem. The current episode started in the past 7 days. The problem has been waxing and waning. The problem occurs constantly. The cough is non-productive (Rare sputum production). Associated symptoms include ear congestion, nasal congestion, postnasal drip, rhinorrhea and a sore throat. Pertinent negatives include no chest pain, chills, ear pain, eye redness, fever, headaches, myalgias, rash, shortness of breath or wheezing. Nothing aggravates the symptoms. Treatments tried: As above and ibuprofen.       Review of Systems   Constitutional: Positive for malaise/fatigue. Negative for chills and fever.   HENT: Positive for congestion, postnasal drip, rhinorrhea and sore throat. Negative for ear discharge and ear pain.    Eyes: Negative for discharge and redness.   Respiratory: Positive for cough and sputum production. Negative for shortness of breath and wheezing.    Cardiovascular: Negative for chest pain and leg swelling.   Gastrointestinal: Negative for abdominal pain, diarrhea and vomiting.   Genitourinary: Negative for dysuria and urgency.   Musculoskeletal: Negative for myalgias and neck pain.   Skin: Negative for itching and rash.   Neurological: Negative for dizziness, tingling and headaches.          Objective:     /80 (BP Location: Left arm, Patient Position:  "Sitting, BP Cuff Size: Adult)   Pulse 82   Temp 36.6 °C (97.8 °F) (Temporal)   Resp 16   Ht 1.651 m (5' 5\")   Wt 88 kg (194 lb)   SpO2 99%   BMI 32.28 kg/m²    PMH:  has a past medical history of IUD (intrauterine device) in place.  MEDS:   Current Outpatient Medications:   •  guaifenesin-codeine (ROBITUSSIN AC) Solution oral solution, Take 5 mL by mouth every bedtime for 10 days., Disp: 50 mL, Rfl: 0  •  escitalopram (LEXAPRO) 20 MG tablet, Take 1 Tab by mouth every day., Disp: 90 Tab, Rfl: 3  •  meclizine (ANTIVERT) 25 MG Tab, Take 1 Tab by mouth 3 times a day as needed., Disp: 30 Tab, Rfl: 0  •  tretinoin (RETIN-A) 0.05 % cream, Pea-sized amount to the entire face at night. Start q2-3nights a week, increase to nightly as tolerated (Patient not taking: Reported on 4/24/2019), Disp: 45 g, Rfl: 3  •  ciprofloxacin (CILOXIN) 0.3 % Solution, Place 1 Drop in right eye every 6 hours. For 5 days (Patient not taking: Reported on 4/24/2019), Disp: 1 Bottle, Rfl: 0  ALLERGIES: No Known Allergies  SURGHX: No past surgical history on file.  SOCHX:  reports that she has never smoked. She has never used smokeless tobacco. She reports that she drinks alcohol. She reports that she does not use drugs.  FH: Family history was reviewed, no pertinent findings to report    Physical Exam   Constitutional: She is oriented to person, place, and time. She appears well-developed and well-nourished.   HENT:   Head: Normocephalic and atraumatic.   Mouth/Throat: No oropharyngeal exudate.   Ears- Canals clear- TM- with clear fluid effusions bilaterally.   Pos. PND, with slight erythema- without tonsillar edema or exudate.   Mild discharge noted bilaterally- to nares.      Eyes: Pupils are equal, round, and reactive to light. EOM are normal.   Neck: Normal range of motion. Neck supple.   Cardiovascular: Normal rate and regular rhythm.   No murmur heard.  Pulmonary/Chest: Effort normal and breath sounds normal. No respiratory distress. "   Musculoskeletal: Normal range of motion. She exhibits no tenderness.   Lymphadenopathy:     She has no cervical adenopathy.   Neurological: She is alert and oriented to person, place, and time.   Skin: Skin is warm. No rash noted.   Psychiatric: She has a normal mood and affect. Her behavior is normal.   Vitals reviewed.              Assessment/Plan:     1. Upper respiratory tract infection, unspecified type  - guaifenesin-codeine (ROBITUSSIN AC) Solution oral solution; Take 5 mL by mouth every bedtime for 10 days.  Dispense: 50 mL; Refill: 0    2. History of recurrent tonsillitis  - REFERRAL TO ENT    It was explained to the pt. Today that due to the viral nature of the pt's illness, we will treat symptomatically today. Encouraged OTC supportive meds PRN. Humidification, increase fluids, avoid night time dairy.   NARXCHECK was reviewed by myself-  Document does not reveal any concerning patterns. Pt. was advised to avoid the operation of heavy machine along with driving while on such medications. Finally pt. was advised to use medication only as prescribed.     Patient given precautionary s/sx that mandate immediate follow up and evaluation in the ED. Advised of risks of not doing so.    DDX, Supportive care, and indications for immediate follow-up discussed with patient.    Instructed to return to clinic or nearest emergency department if we are not available for any change in condition, further concerns, or worsening of symptoms.    The patient demonstrated a good understanding and agreed with the treatment plan.    Please note that this dictation was created using voice recognition software. I have made every reasonable attempt to correct obvious errors, but I expect that there are errors of grammar and possibly content that I did not discover before finalizing the note.

## 2019-09-11 DIAGNOSIS — R05.9 COUGH: ICD-10-CM

## 2019-09-11 RX ORDER — BENZONATATE 100 MG/1
200 CAPSULE ORAL 3 TIMES DAILY PRN
Qty: 30 CAP | Refills: 0 | Status: SHIPPED | OUTPATIENT
Start: 2019-09-11 | End: 2020-10-25

## 2019-09-25 ENCOUNTER — APPOINTMENT (OUTPATIENT)
Dept: DERMATOLOGY | Facility: IMAGING CENTER | Age: 19
End: 2019-09-25

## 2019-09-26 ENCOUNTER — IMMUNIZATION (OUTPATIENT)
Dept: OCCUPATIONAL MEDICINE | Facility: CLINIC | Age: 19
End: 2019-09-26

## 2019-09-26 DIAGNOSIS — Z23 NEED FOR VACCINATION: ICD-10-CM

## 2019-09-26 PROCEDURE — 90686 IIV4 VACC NO PRSV 0.5 ML IM: CPT | Performed by: PREVENTIVE MEDICINE

## 2019-10-04 ENCOUNTER — EH NON-PROVIDER (OUTPATIENT)
Dept: OCCUPATIONAL MEDICINE | Facility: CLINIC | Age: 19
End: 2019-10-04

## 2019-10-04 DIAGNOSIS — Z02.89 ENCOUNTER FOR OCCUPATIONAL HEALTH EXAMINATION INVOLVING RESPIRATOR: Primary | ICD-10-CM

## 2019-10-04 PROCEDURE — 94375 RESPIRATORY FLOW VOLUME LOOP: CPT | Performed by: NURSE PRACTITIONER

## 2019-10-05 ENCOUNTER — DOCUMENTATION (OUTPATIENT)
Dept: OCCUPATIONAL MEDICINE | Facility: CLINIC | Age: 19
End: 2019-10-05

## 2019-10-16 ENCOUNTER — APPOINTMENT (OUTPATIENT)
Dept: DERMATOLOGY | Facility: IMAGING CENTER | Age: 19
End: 2019-10-16

## 2020-07-16 ENCOUNTER — HOSPITAL ENCOUNTER (OUTPATIENT)
Dept: LAB | Facility: MEDICAL CENTER | Age: 20
End: 2020-07-16
Attending: NURSE PRACTITIONER
Payer: COMMERCIAL

## 2020-07-16 LAB
AMBIGUOUS DTTM AMBI4: NORMAL
APPEARANCE UR: CLEAR
BACTERIA #/AREA URNS HPF: ABNORMAL /HPF
BILIRUB UR QL STRIP.AUTO: NEGATIVE
COLOR UR: YELLOW
EPI CELLS #/AREA URNS HPF: ABNORMAL /HPF
GLUCOSE UR STRIP.AUTO-MCNC: NEGATIVE MG/DL
HYALINE CASTS #/AREA URNS LPF: ABNORMAL /LPF
KETONES UR STRIP.AUTO-MCNC: NEGATIVE MG/DL
LEUKOCYTE ESTERASE UR QL STRIP.AUTO: ABNORMAL
MICRO URNS: ABNORMAL
NITRITE UR QL STRIP.AUTO: NEGATIVE
PH UR STRIP.AUTO: 5.5 [PH] (ref 5–8)
PROT UR QL STRIP: NEGATIVE MG/DL
RBC # URNS HPF: ABNORMAL /HPF
RBC UR QL AUTO: ABNORMAL
SP GR UR STRIP.AUTO: 1.02
UROBILINOGEN UR STRIP.AUTO-MCNC: 0.2 MG/DL
WBC #/AREA URNS HPF: ABNORMAL /HPF

## 2020-07-16 PROCEDURE — 87086 URINE CULTURE/COLONY COUNT: CPT

## 2020-07-16 PROCEDURE — 87077 CULTURE AEROBIC IDENTIFY: CPT

## 2020-07-16 PROCEDURE — 81001 URINALYSIS AUTO W/SCOPE: CPT

## 2020-07-16 PROCEDURE — 87186 SC STD MICRODIL/AGAR DIL: CPT

## 2020-07-18 LAB
BACTERIA UR CULT: ABNORMAL
BACTERIA UR CULT: ABNORMAL
SIGNIFICANT IND 70042: ABNORMAL
SITE SITE: ABNORMAL
SOURCE SOURCE: ABNORMAL

## 2020-08-14 ENCOUNTER — HOSPITAL ENCOUNTER (OUTPATIENT)
Dept: LAB | Facility: MEDICAL CENTER | Age: 20
End: 2020-08-14
Attending: NURSE PRACTITIONER
Payer: COMMERCIAL

## 2020-08-14 LAB
APPEARANCE UR: CLEAR
BILIRUB UR QL STRIP.AUTO: NEGATIVE
COLOR UR: YELLOW
GLUCOSE UR STRIP.AUTO-MCNC: NEGATIVE MG/DL
KETONES UR STRIP.AUTO-MCNC: NEGATIVE MG/DL
LEUKOCYTE ESTERASE UR QL STRIP.AUTO: NEGATIVE
MICRO URNS: NORMAL
NITRITE UR QL STRIP.AUTO: NEGATIVE
PH UR STRIP.AUTO: 8 [PH] (ref 5–8)
PROT UR QL STRIP: NEGATIVE MG/DL
RBC UR QL AUTO: NEGATIVE
SP GR UR STRIP.AUTO: 1.03
UROBILINOGEN UR STRIP.AUTO-MCNC: 0.2 MG/DL

## 2020-08-14 PROCEDURE — 87086 URINE CULTURE/COLONY COUNT: CPT

## 2020-08-14 PROCEDURE — 81003 URINALYSIS AUTO W/O SCOPE: CPT

## 2020-08-17 LAB
BACTERIA UR CULT: NORMAL
SIGNIFICANT IND 70042: NORMAL
SITE SITE: NORMAL
SOURCE SOURCE: NORMAL

## 2020-10-02 ENCOUNTER — IMMUNIZATION (OUTPATIENT)
Dept: OCCUPATIONAL MEDICINE | Facility: CLINIC | Age: 20
End: 2020-10-02

## 2020-10-02 DIAGNOSIS — Z23 NEED FOR VACCINATION: ICD-10-CM

## 2020-10-02 PROCEDURE — 90686 IIV4 VACC NO PRSV 0.5 ML IM: CPT | Performed by: NURSE PRACTITIONER

## 2020-10-25 ENCOUNTER — OFFICE VISIT (OUTPATIENT)
Dept: URGENT CARE | Facility: CLINIC | Age: 20
End: 2020-10-25
Payer: COMMERCIAL

## 2020-10-25 VITALS
TEMPERATURE: 97.5 F | HEART RATE: 90 BPM | BODY MASS INDEX: 36.99 KG/M2 | SYSTOLIC BLOOD PRESSURE: 124 MMHG | WEIGHT: 222 LBS | DIASTOLIC BLOOD PRESSURE: 80 MMHG | OXYGEN SATURATION: 98 % | HEIGHT: 65 IN | RESPIRATION RATE: 16 BRPM

## 2020-10-25 DIAGNOSIS — K58.2 IRRITABLE BOWEL SYNDROME WITH BOTH CONSTIPATION AND DIARRHEA: ICD-10-CM

## 2020-10-25 LAB
APPEARANCE UR: NORMAL
BILIRUB UR STRIP-MCNC: NEGATIVE MG/DL
COLOR UR AUTO: YELLOW
GLUCOSE UR STRIP.AUTO-MCNC: NEGATIVE MG/DL
INT CON NEG: NORMAL
INT CON POS: NORMAL
KETONES UR STRIP.AUTO-MCNC: NEGATIVE MG/DL
LEUKOCYTE ESTERASE UR QL STRIP.AUTO: NEGATIVE
NITRITE UR QL STRIP.AUTO: NEGATIVE
PH UR STRIP.AUTO: 7 [PH] (ref 5–8)
POC URINE PREGNANCY TEST: NEGATIVE
PROT UR QL STRIP: NEGATIVE MG/DL
RBC UR QL AUTO: NORMAL
SP GR UR STRIP.AUTO: 1.02
UROBILINOGEN UR STRIP-MCNC: 0.2 MG/DL

## 2020-10-25 PROCEDURE — 81002 URINALYSIS NONAUTO W/O SCOPE: CPT | Performed by: PHYSICIAN ASSISTANT

## 2020-10-25 PROCEDURE — 81025 URINE PREGNANCY TEST: CPT | Performed by: PHYSICIAN ASSISTANT

## 2020-10-25 PROCEDURE — 99214 OFFICE O/P EST MOD 30 MIN: CPT | Performed by: PHYSICIAN ASSISTANT

## 2020-10-25 RX ORDER — DICYCLOMINE HYDROCHLORIDE 10 MG/1
10 CAPSULE ORAL
Qty: 60 CAP | Refills: 0 | Status: SHIPPED | OUTPATIENT
Start: 2020-10-25 | End: 2020-11-09

## 2020-10-25 ASSESSMENT — ENCOUNTER SYMPTOMS
BLOOD IN STOOL: 0
NAUSEA: 0
ABDOMINAL PAIN: 1
CONSTIPATION: 1
HEARTBURN: 0
BLOATING: 1
FEVER: 0
DIARRHEA: 1
CHILLS: 0
VOMITING: 0

## 2020-10-25 NOTE — LETTER
October 25, 2020         Patient: Shon Kemp   YOB: 2000   Date of Visit: 10/25/2020           To Whom it May Concern:    Shon Kemp was seen in my clinic on 10/25/2020.   If you have any questions or concerns, please don't hesitate to call.        Sincerely,           Kvng Wu P.A.-C.  Electronically Signed

## 2020-10-25 NOTE — PROGRESS NOTES
Subjective:   Shon Kemp is a 20 y.o. female who presents today with   Chief Complaint   Patient presents with   • Diarrhea     x over 10 yrs. chronic diarrhea worse for x 3-4 wks, and abdominal pain       Diarrhea   This is a new problem. The current episode started more than 1 year ago. The problem has been waxing and waning. The stool consistency is described as watery. The patient states that diarrhea does not awaken her from sleep. Associated symptoms include abdominal pain and bloating. Pertinent negatives include no chills, fever or vomiting.     Patient states she has what she believes to be IBS and has had symptoms over the past 10 years.  She states it has been worse over the last 3 to 4 weeks.  She recently got engaged and has a lot of stress she reports.  She states that she has diarrhea about 5 times a day.  She states that she has constipation as well.  She feels the sudden urge to go to the bathroom but then when she goes not much comes out.  She states that they are loose stools were watery but no blood in the stool.She denies any other symptoms.  PMH:  has a past medical history of IUD (intrauterine device) in place.  MEDS:   Current Outpatient Medications:   •  dicyclomine (BENTYL) 10 MG Cap, Take 1 Cap by mouth 4 Times a Day,Before Meals and at Bedtime for 15 days., Disp: 60 Cap, Rfl: 0  ALLERGIES: No Known Allergies  SURGHX: History reviewed. No pertinent surgical history.  SOCHX:  reports that she has never smoked. She has never used smokeless tobacco. She reports current alcohol use. She reports that she does not use drugs.  FH: Reviewed with patient, not pertinent to this visit.       Review of Systems   Constitutional: Negative for chills and fever.   Gastrointestinal: Positive for abdominal pain, bloating, constipation and diarrhea. Negative for blood in stool, heartburn, nausea and vomiting.   All other systems reviewed and are negative.       Objective:   /80 (BP  "Location: Left arm, Patient Position: Sitting, BP Cuff Size: Large adult)   Pulse 90   Temp 36.4 °C (97.5 °F) (Temporal)   Resp 16   Ht 1.651 m (5' 5\")   Wt 100.7 kg (222 lb)   SpO2 98%   BMI 36.94 kg/m²   Physical Exam  Vitals signs and nursing note reviewed.   Constitutional:       General: She is not in acute distress.     Appearance: She is well-developed.   HENT:      Head: Normocephalic and atraumatic.      Right Ear: Hearing normal.      Left Ear: Hearing normal.   Eyes:      Pupils: Pupils are equal, round, and reactive to light.   Cardiovascular:      Rate and Rhythm: Normal rate and regular rhythm.      Heart sounds: Normal heart sounds.   Pulmonary:      Effort: Pulmonary effort is normal.      Breath sounds: Normal breath sounds.   Abdominal:      General: Bowel sounds are normal. There is no distension.      Palpations: Abdomen is soft.      Tenderness: There is abdominal tenderness. There is no right CVA tenderness, left CVA tenderness, guarding or rebound. Negative signs include Garcia's sign and McBurney's sign.       Musculoskeletal:      Comments: Normal movement in all 4 extremities   Skin:     General: Skin is warm and dry.   Neurological:      Mental Status: She is alert.      Coordination: Coordination normal.   Psychiatric:         Mood and Affect: Mood normal.         UA NEG  PREGNANCY NEG  Assessment/Plan:   Assessment    1. Irritable bowel syndrome with both constipation and diarrhea  - POCT Urinalysis  - POCT Pregnancy  - REFERRAL TO GASTROENTEROLOGY  - dicyclomine (BENTYL) 10 MG Cap; Take 1 Cap by mouth 4 Times a Day,Before Meals and at Bedtime for 15 days.  Dispense: 60 Cap; Refill: 0  Avoid eating foods that will upset her stomach.  Follow-up with GI if symptoms persist.  Symptoms seem to be most consistent with IBS today given her constipation, diarrhea and consistencies with her recent anxiety.  Differential diagnosis, natural history, supportive care, and indications for " immediate follow-up discussed.   Patient given instructions and understanding of medications and treatment.    If not improving in 3-5 days, F/U with PCP or return to UC if symptoms worsen.    Patient agreeable to plan.    Please note that this dictation was created using voice recognition software. I have made every reasonable attempt to correct obvious errors, but I expect that there are errors of grammar and possibly content that I did not discover before finalizing the note.    Kvng Wu PA-C

## 2020-12-16 DIAGNOSIS — Z23 NEED FOR VACCINATION: ICD-10-CM

## 2020-12-23 ENCOUNTER — EH NON-PROVIDER (OUTPATIENT)
Dept: OCCUPATIONAL MEDICINE | Facility: CLINIC | Age: 20
End: 2020-12-23

## 2020-12-23 ENCOUNTER — HOSPITAL ENCOUNTER (OUTPATIENT)
Facility: MEDICAL CENTER | Age: 20
End: 2020-12-23
Attending: NURSE PRACTITIONER
Payer: COMMERCIAL

## 2020-12-23 DIAGNOSIS — Z11.59 ENCOUNTER FOR SCREENING FOR OTHER VIRAL DISEASES: ICD-10-CM

## 2020-12-23 LAB
COVID ORDER STATUS COVID19: NORMAL
SARS-COV-2 RNA RESP QL NAA+PROBE: NOTDETECTED
SPECIMEN SOURCE: NORMAL

## 2020-12-23 PROCEDURE — U0003 INFECTIOUS AGENT DETECTION BY NUCLEIC ACID (DNA OR RNA); SEVERE ACUTE RESPIRATORY SYNDROME CORONAVIRUS 2 (SARS-COV-2) (CORONAVIRUS DISEASE [COVID-19]), AMPLIFIED PROBE TECHNIQUE, MAKING USE OF HIGH THROUGHPUT TECHNOLOGIES AS DESCRIBED BY CMS-2020-01-R: HCPCS | Performed by: NURSE PRACTITIONER

## 2020-12-29 ENCOUNTER — IMMUNIZATION (OUTPATIENT)
Dept: FAMILY PLANNING/WOMEN'S HEALTH CLINIC | Facility: IMMUNIZATION CENTER | Age: 20
End: 2020-12-29
Payer: COMMERCIAL

## 2020-12-29 DIAGNOSIS — Z23 ENCOUNTER FOR VACCINATION: Primary | ICD-10-CM

## 2020-12-29 PROCEDURE — 91301 MODERNA SARS-COV-2 VACCINE: CPT

## 2020-12-29 PROCEDURE — 0011A MODERNA SARS-COV-2 VACCINE: CPT

## 2021-01-30 PROCEDURE — 91301 MODERNA SARS-COV-2 VACCINE: CPT

## 2021-01-30 PROCEDURE — 0012A MODERNA SARS-COV-2 VACCINE: CPT

## 2021-01-31 ENCOUNTER — IMMUNIZATION (OUTPATIENT)
Dept: FAMILY PLANNING/WOMEN'S HEALTH CLINIC | Facility: IMMUNIZATION CENTER | Age: 21
End: 2021-01-31
Payer: COMMERCIAL

## 2021-01-31 DIAGNOSIS — Z23 ENCOUNTER FOR VACCINATION: Primary | ICD-10-CM

## 2021-06-08 ENCOUNTER — APPOINTMENT (OUTPATIENT)
Dept: DERMATOLOGY | Facility: IMAGING CENTER | Age: 21
End: 2021-06-08

## 2023-03-28 ENCOUNTER — DOCUMENTATION (OUTPATIENT)
Dept: OCCUPATIONAL MEDICINE | Facility: CLINIC | Age: 23
End: 2023-03-28
Payer: COMMERCIAL

## 2023-03-28 NOTE — PROGRESS NOTES
Pt has an appointment for Pre-Employment Physical with [facility] on 04/04/2023.    Currently missing the following vaccine documentation:    Tdap  Flu Shot    Contacted via email on 03/28/2023, requesting missing documentation. If unable to obtain by the appointment  date, lab titers will be drawn, and/or vaccines will be given.

## 2023-04-04 ENCOUNTER — HOSPITAL ENCOUNTER (OUTPATIENT)
Facility: MEDICAL CENTER | Age: 23
End: 2023-04-04
Attending: NURSE PRACTITIONER
Payer: COMMERCIAL

## 2023-04-04 ENCOUNTER — EH NON-PROVIDER (OUTPATIENT)
Dept: OCCUPATIONAL MEDICINE | Facility: CLINIC | Age: 23
End: 2023-04-04

## 2023-04-04 ENCOUNTER — EMPLOYEE HEALTH (OUTPATIENT)
Dept: OCCUPATIONAL MEDICINE | Facility: CLINIC | Age: 23
End: 2023-04-04

## 2023-04-04 DIAGNOSIS — Z02.89 ENCOUNTER FOR OCCUPATIONAL HEALTH ASSESSMENT: ICD-10-CM

## 2023-04-04 DIAGNOSIS — Z02.89 ENCOUNTER FOR OCCUPATIONAL HEALTH ASSESSMENT: Primary | ICD-10-CM

## 2023-04-04 LAB
AMP AMPHETAMINE: NORMAL
BAR BARBITURATES: NORMAL
BZO BENZODIAZEPINES: NORMAL
COC COCAINE: NORMAL
INT CON NEG: NORMAL
INT CON POS: NORMAL
MDMA ECSTASY: NORMAL
MET METHAMPHETAMINES: NORMAL
MTD METHADONE: NORMAL
OPI OPIATES: NORMAL
OXY OXYCODONE: NORMAL
PCP PHENCYCLIDINE: NORMAL
POC URINE DRUG SCREEN OCDRS: NORMAL
THC: NORMAL

## 2023-04-04 PROCEDURE — 8915 PR COMPREHENSIVE PHYSICAL: Performed by: PREVENTIVE MEDICINE

## 2023-04-04 PROCEDURE — 94375 RESPIRATORY FLOW VOLUME LOOP: CPT | Performed by: NURSE PRACTITIONER

## 2023-04-04 PROCEDURE — 80305 DRUG TEST PRSMV DIR OPT OBS: CPT | Performed by: NURSE PRACTITIONER

## 2023-04-04 PROCEDURE — 86480 TB TEST CELL IMMUN MEASURE: CPT | Performed by: NURSE PRACTITIONER

## 2023-04-05 LAB
GAMMA INTERFERON BACKGROUND BLD IA-ACNC: 0.1 IU/ML
M TB IFN-G BLD-IMP: NEGATIVE
M TB IFN-G CD4+ BCKGRND COR BLD-ACNC: 0 IU/ML
MITOGEN IGNF BCKGRD COR BLD-ACNC: >10 IU/ML
QFT TB2 - NIL TBQ2: -0.01 IU/ML

## 2023-04-07 ENCOUNTER — EH NON-PROVIDER (OUTPATIENT)
Dept: OCCUPATIONAL MEDICINE | Facility: CLINIC | Age: 23
End: 2023-04-07

## 2025-06-22 ENCOUNTER — OFFICE VISIT (OUTPATIENT)
Dept: URGENT CARE | Facility: PHYSICIAN GROUP | Age: 25
End: 2025-06-22
Payer: COMMERCIAL

## 2025-06-22 VITALS
BODY MASS INDEX: 29.29 KG/M2 | HEART RATE: 74 BPM | TEMPERATURE: 98.5 F | DIASTOLIC BLOOD PRESSURE: 74 MMHG | HEIGHT: 65 IN | RESPIRATION RATE: 16 BRPM | WEIGHT: 175.8 LBS | OXYGEN SATURATION: 99 % | SYSTOLIC BLOOD PRESSURE: 114 MMHG

## 2025-06-22 DIAGNOSIS — R19.7 DIARRHEA, UNSPECIFIED TYPE: Primary | ICD-10-CM

## 2025-06-22 PROCEDURE — 99203 OFFICE O/P NEW LOW 30 MIN: CPT | Performed by: FAMILY MEDICINE

## 2025-06-22 PROCEDURE — 3074F SYST BP LT 130 MM HG: CPT | Performed by: FAMILY MEDICINE

## 2025-06-22 PROCEDURE — 3078F DIAST BP <80 MM HG: CPT | Performed by: FAMILY MEDICINE

## 2025-06-22 RX ORDER — TIRZEPATIDE 10 MG/.5ML
INJECTION, SOLUTION SUBCUTANEOUS
COMMUNITY
Start: 2025-06-02

## 2025-06-22 NOTE — LETTER
June 22, 2025         Patient: Shon Kemp   YOB: 2000   Date of Visit: 6/22/2025           To Whom it May Concern:    Shon Kemp was seen in my clinic on 6/22/2025.     Please excuse from work for 6/22/25 due to medical condition.     If you have any questions or concerns, please don't hesitate to call.        Sincerely,           Mino Ruiz M.D.  Electronically Signed

## 2025-06-22 NOTE — PROGRESS NOTES
Chief Complaint:    Chief Complaint   Patient presents with    Diarrhea     Pt states she's had diarrhea since this morning.  Needs a doctors note.        History of Present Illness:    Diarrhea started this morning. Has had 3-4 episodes of diarrhea today, but it is slowing down. No fever, nasal symptoms, sore throat, cough, nausea, or vomiting. She feels the the diarrhea was caused by something bad she ate while going out last night. She needs work note.      Past Medical History:    Past Medical History[1]    Past Surgical History:    Past Surgical History[2]    Social History:    Social History     Socioeconomic History    Marital status: Single     Spouse name: Not on file    Number of children: Not on file    Years of education: Not on file    Highest education level: Not on file   Occupational History    Not on file   Tobacco Use    Smoking status: Never    Smokeless tobacco: Never   Substance and Sexual Activity    Alcohol use: Yes     Comment: 5 a week, mix of wine/beer/liquor    Drug use: No    Sexual activity: Yes     Partners: Male     Birth control/protection: I.U.D.   Other Topics Concern    Not on file   Social History Narrative    Not on file     Social Drivers of Health     Financial Resource Strain: Not on file   Food Insecurity: Not on file   Transportation Needs: Not on file   Physical Activity: Not on file   Stress: Not on file   Social Connections: Not on file   Intimate Partner Violence: Not on file   Housing Stability: Not on file     Family History:    Family History   Problem Relation Age of Onset    Thyroid Mother     Hyperlipidemia Mother     Asthma Father     Bipolar disorder Father     Diabetes Sister         type 1    Diabetes Maternal Grandmother         type 2    Heart Attack Maternal Grandfather         x3    Hypertension Maternal Grandfather     Hyperlipidemia Maternal Grandfather     No Known Problems Paternal Grandmother     No Known Problems Paternal Grandfather     Diabetes  "Sister         type 1     Medications:    Medications Ordered Prior to Encounter[3]    Allergies:    Allergies[4]      Vitals:    Vitals:    25 1409   BP: 114/74   Pulse: 74   Resp: 16   Temp: 36.9 °C (98.5 °F)   TempSrc: Temporal   SpO2: 99%   Weight: 79.7 kg (175 lb 12.8 oz)   Height: 1.651 m (5' 5\")       Physical Exam:    Constitutional: Vital signs reviewed. Appears well-developed and well-nourished. No acute distress.   Eyes: Sclera white, conjunctivae clear.   ENT: External ears normal. Hearing normal.   Neck: Neck supple.   Pulmonary/Chest: Respirations non-labored.   Musculoskeletal: Normal gait. No muscular atrophy or weakness.  Neurological: Alert and oriented to person, place, and time. Muscle tone normal. Coordination normal.   Skin: No rashes or lesions. Warm, dry, normal turgor.  Psychiatric: Normal mood and affect. Behavior is normal. Judgment and thought content normal.       Assessment / Plan & Medical Decision Makin. Diarrhea, unspecified type (Primary)       Work note given - excuse from work for 25.    Discussed with her DDX, management options, and risks, benefits, and alternatives to treatment plan agreed upon.    Diarrhea started this morning. Has had 3-4 episodes of diarrhea today, but it is slowing down. No fever, nasal symptoms, sore throat, cough, nausea, or vomiting. She feels the the diarrhea was caused by something bad she ate while going out last night. She needs work note.    Recommended treat symptoms with medication(s) as needed, otherwise further observation and see if symptoms will self-resolve (seems to be improving per patient).    May use OTC Pepto Bismol and/or Imodium as needed for diarrhea.     Discussed expected course of duration, time for improvement, and to seek follow-up in Emergency Room, urgent care, or with PCP if getting worse at any time or not improving within expected time frame.        [1]   Past Medical History:  Diagnosis Date    IUD " (intrauterine device) in place     Paragard 2017   [2] History reviewed. No pertinent surgical history.  [3]   Current Outpatient Medications on File Prior to Visit   Medication Sig Dispense Refill    ZEPBOUND 10 MG/0.5ML Solution Auto-injector        No current facility-administered medications on file prior to visit.   [4] No Known Allergies